# Patient Record
Sex: FEMALE | Race: WHITE | NOT HISPANIC OR LATINO | URBAN - METROPOLITAN AREA
[De-identification: names, ages, dates, MRNs, and addresses within clinical notes are randomized per-mention and may not be internally consistent; named-entity substitution may affect disease eponyms.]

---

## 2023-10-27 ENCOUNTER — APPOINTMENT (RX ONLY)
Dept: URBAN - METROPOLITAN AREA CLINIC 153 | Facility: CLINIC | Age: 33
Setting detail: DERMATOLOGY
End: 2023-10-27

## 2023-10-27 DIAGNOSIS — D22 MELANOCYTIC NEVI: ICD-10-CM

## 2023-10-27 DIAGNOSIS — L57.8 OTHER SKIN CHANGES DUE TO CHRONIC EXPOSURE TO NONIONIZING RADIATION: ICD-10-CM

## 2023-10-27 PROBLEM — D22.5 MELANOCYTIC NEVI OF TRUNK: Status: ACTIVE | Noted: 2023-10-27

## 2023-10-27 PROCEDURE — ? SUNSCREEN RECOMMENDATIONS

## 2023-10-27 PROCEDURE — ? COUNSELING

## 2023-10-27 PROCEDURE — 99203 OFFICE O/P NEW LOW 30 MIN: CPT

## 2023-10-27 ASSESSMENT — LOCATION DETAILED DESCRIPTION DERM
LOCATION DETAILED: RIGHT INFERIOR MEDIAL UPPER BACK
LOCATION DETAILED: RIGHT MID-UPPER BACK
LOCATION DETAILED: LEFT MEDIAL BREAST 10-11:00 REGION
LOCATION DETAILED: LEFT MID-UPPER BACK

## 2023-10-27 ASSESSMENT — LOCATION SIMPLE DESCRIPTION DERM
LOCATION SIMPLE: LEFT UPPER BACK
LOCATION SIMPLE: RIGHT UPPER BACK
LOCATION SIMPLE: LEFT BREAST

## 2023-10-27 ASSESSMENT — LOCATION ZONE DERM: LOCATION ZONE: TRUNK

## 2023-10-27 NOTE — PROCEDURE: MIPS QUALITY
Quality 226: Preventive Care And Screening: Tobacco Use: Screening And Cessation Intervention: Patient screened for tobacco use and is an ex/non-smoker
Quality 110: Preventive Care And Screening: Influenza Immunization: Influenza Immunization not Administered for Documented Reasons.
Quality 431: Preventive Care And Screening: Unhealthy Alcohol Use - Screening: Patient identified as an unhealthy alcohol user when screened for unhealthy alcohol use using a systematic screening method and received brief counseling
Detail Level: Detailed

## 2023-10-27 NOTE — HPI: SECONDARY COMPLAINT
How Severe Is This Condition?: moderate [facility] ENCOUNTER  FAMILY MEDICINE OFFICE VISIT    CHIEF COMPLAINT:  Contraception       HISTORY OF PRESENT ILLNESS:    Traci Sr is a pleasant 27 year old female who presents today for: removal of her etonogestrel ronnie implant Nexplanon. She would like it removed because of: (SIDE AFFECTS - weight).    An informed consent was taken prior to removal.    Risks of the procedure include: bleeding, infection, difficulty with removal, scarring and nerve damage.  There may be bruising at the site of incision and down the arm.    She feels well; she still feels like she has discharge that is BV even though her last wet prep was normal. Will have her get a self swab today.    PROBLEM LIST:    Patient Active Problem List   Diagnosis   • Mild nonrheumatic tricuspid valve regurgitation - oct 2020 echo       HISTORIES:    I have reviewed the past medical history, family history, social history, medications and allergies listed in the medical record as obtained by my nursing staff and support staff and agree with their documentation.  ALLERGIES:  No Known Allergies  Current Outpatient Medications   Medication Sig Dispense Refill   • norgestimate-ethinyl estradiol (ORTHO-CYCLEN) 0.25-35 MG-MCG per tablet Take 1 tablet by mouth daily. 84 tablet 3   • spironolactone (ALDACTONE) 50 MG tablet TAKE 1 TABLET BY MOUTH DAILY 30 tablet 0   • UNKNOWN TO PATIENT Birth Control Implant       No current facility-administered medications for this visit.        REVIEW OF SYSTEMS:    Constitutional: Negative for fever and chills.   Skin: Negative for rash.   HEENT: Negative for eye drainage, rhinorrhea, ear pain, sore throat.  Respiratory: Negative for cough, wheezing or shortness of breath.    Cardiovascular: Negative for chest pain, chest pressure or palpitations.   Gastrointestinal: Negative for nausea, vomiting, diarrhea.   Genitourinary: Negative for dysuria, urgency, frequency or hematuria.  Extremities:  Negative for  joint swelling or joint pain.  Psychiatric: Negative for change in mood or mentation.     PHYSICAL EXAM:    Vital Signs:    Visit Vitals  /70   Pulse 78   Wt 76.2 kg   LMP  (LMP Unknown)   BMI 29.06 kg/m²     General:   Alert, cooperative, conversive in no acute distress.  Skin:  Warm and dry without rash.    Head:  Normocephalic, atraumatic.   Neck:  Trachea is midline.     Eyes:  Normal conjunctivae and sclerae. EOMI. No scleral icterus.  ENT:   Moist oral (mouth) mucosa.  Musculoskeletal:  No deformity or edema. Normal gait.  Back:  Normal alignment.    Neurologic:  Oriented x4.  No focal deficits.  Psychiatric:  Cooperative.  Appropriate mood and affect.    LABORATORY DATA:    Wet prep shows BV    IMAGING STUDIES:    N/A    ASSESSMENT:    1. Vaginal discharge    2. OCP (oral contraceptive pills) initiation        PLAN:    Orders Placed This Encounter   • WET MOUNT   • norgestimate-ethinyl estradiol (ORTHO-CYCLEN) 0.25-35 MG-MCG per tablet         DISCUSSION:    1. Nexplanon Removal:    Procedure Note:  Procedure: Nexplanon Removal  Side: LEFT)  Position correct for procedure YES  Equipment for procedure available (YES    The patient is place in the supine position. Asceptic conditions are maintained. The ronnie is located by  palpation. The area is cleaned with Povidone Iodine. 2 cc of 1% lidocaine with epinephrine is injected  just underneath the end of the implant closest to the elbow. After firmly pressing down on the end of the  implant closer to the axilla a 2-3 mm incision is made with a scalpel. The ronnie is pushed to the incision site  and grasped with a mosquito forceps and gently removed. Blunt dissection WAS NOT needed.  The patient DID tolerate the procedure well. The 4 cm ronnie was removed in its entirety. The  incision was dressed with a small adhesive bandage closure and a pressure dressing was applied.    An alternate plan for contraception was discussed. The patient would like to use COCP for her  contraception. She was given a detailed instruction sheet about her desired method of  Contraception. SHE IS AWARE OF BACK UP CONTRACEPTION FOR 7 DAYS    The hormonal effects end promptly after removal; circulating levels of etonogestrel are undetectable in one week, and more than 90 percent of women ovulate within three to four weeks of removal.    Derik Evans MD    2. BV:  - Metronidazole    3. OCP pills initation  - Sprintec      FOLLOW UP:    No follow-ups on file.      The patient indicated understanding of the diagnosis and agreed with the plan of care.